# Patient Record
Sex: FEMALE
[De-identification: names, ages, dates, MRNs, and addresses within clinical notes are randomized per-mention and may not be internally consistent; named-entity substitution may affect disease eponyms.]

---

## 2017-02-15 ENCOUNTER — HOSPITAL ENCOUNTER (OUTPATIENT)
Dept: HOSPITAL 5 - OPU | Age: 62
Discharge: HOME | End: 2017-02-15
Attending: INTERNAL MEDICINE
Payer: MEDICARE

## 2017-02-15 VITALS — SYSTOLIC BLOOD PRESSURE: 124 MMHG | DIASTOLIC BLOOD PRESSURE: 59 MMHG

## 2017-02-15 DIAGNOSIS — Z95.5: ICD-10-CM

## 2017-02-15 DIAGNOSIS — I25.110: Primary | ICD-10-CM

## 2017-02-15 DIAGNOSIS — E78.5: ICD-10-CM

## 2017-02-15 DIAGNOSIS — E11.9: ICD-10-CM

## 2017-02-15 DIAGNOSIS — I10: ICD-10-CM

## 2017-02-15 DIAGNOSIS — Z87.891: ICD-10-CM

## 2017-02-15 DIAGNOSIS — Z79.01: ICD-10-CM

## 2017-02-15 DIAGNOSIS — I25.2: ICD-10-CM

## 2017-02-15 DIAGNOSIS — D64.9: ICD-10-CM

## 2017-02-15 DIAGNOSIS — Z82.49: ICD-10-CM

## 2017-02-15 LAB
ANION GAP SERPL CALC-SCNC: 18 MMOL/L
BASOPHILS NFR BLD AUTO: 0.5 % (ref 0–1.8)
BUN SERPL-MCNC: 23 MG/DL (ref 7–17)
BUN/CREAT SERPL: 32.85 %
CALCIUM SERPL-MCNC: 9 MG/DL (ref 8.4–10.2)
CHLORIDE SERPL-SCNC: 104.1 MMOL/L (ref 98–107)
CO2 SERPL-SCNC: 23 MMOL/L (ref 22–30)
EOSINOPHIL NFR BLD AUTO: 0 % (ref 0–4.3)
GLUCOSE SERPL-MCNC: 129 MG/DL (ref 65–100)
HCT VFR BLD CALC: 36.8 % (ref 30.3–42.9)
HGB BLD-MCNC: 12.4 GM/DL (ref 10.1–14.3)
INR PPP: 1.15 (ref 0.87–1.13)
MCH RBC QN AUTO: 29 PG (ref 28–32)
MCHC RBC AUTO-ENTMCNC: 34 % (ref 30–34)
MCV RBC AUTO: 85 FL (ref 79–97)
PLATELET # BLD: 166 K/MM3 (ref 140–440)
POTASSIUM SERPL-SCNC: 4.2 MMOL/L (ref 3.6–5)
RBC # BLD AUTO: 4.32 M/MM3 (ref 3.65–5.03)
SODIUM SERPL-SCNC: 141 MMOL/L (ref 137–145)
WBC # BLD AUTO: 3.8 K/MM3 (ref 4.5–11)

## 2017-02-15 PROCEDURE — 93458 L HRT ARTERY/VENTRICLE ANGIO: CPT

## 2017-02-15 PROCEDURE — 36415 COLL VENOUS BLD VENIPUNCTURE: CPT

## 2017-02-15 PROCEDURE — 93571 IV DOP VEL&/PRESS C FLO 1ST: CPT

## 2017-02-15 PROCEDURE — 80048 BASIC METABOLIC PNL TOTAL CA: CPT

## 2017-02-15 PROCEDURE — 85610 PROTHROMBIN TIME: CPT

## 2017-02-15 PROCEDURE — 93010 ELECTROCARDIOGRAM REPORT: CPT

## 2017-02-15 PROCEDURE — 85730 THROMBOPLASTIN TIME PARTIAL: CPT

## 2017-02-15 PROCEDURE — 85025 COMPLETE CBC W/AUTO DIFF WBC: CPT

## 2017-02-15 PROCEDURE — 85347 COAGULATION TIME ACTIVATED: CPT

## 2017-02-15 PROCEDURE — C1769 GUIDE WIRE: HCPCS

## 2017-02-15 PROCEDURE — C1887 CATHETER, GUIDING: HCPCS

## 2017-02-15 PROCEDURE — C1894 INTRO/SHEATH, NON-LASER: HCPCS

## 2017-02-15 PROCEDURE — 93005 ELECTROCARDIOGRAM TRACING: CPT

## 2017-02-15 RX ADMIN — MIDAZOLAM ONE MG: 1 INJECTION INTRAMUSCULAR; INTRAVENOUS at 09:52

## 2017-02-15 RX ADMIN — MIDAZOLAM ONE MG: 1 INJECTION INTRAMUSCULAR; INTRAVENOUS at 09:29

## 2017-02-15 RX ADMIN — FENTANYL CITRATE ONE MCG: 50 INJECTION, SOLUTION INTRAMUSCULAR; INTRAVENOUS at 09:29

## 2017-02-15 RX ADMIN — LIDOCAINE HYDROCHLORIDE ONE ML: 20 INJECTION, SOLUTION INFILTRATION; PERINEURAL at 09:29

## 2017-02-15 RX ADMIN — LIDOCAINE HYDROCHLORIDE ONE ML: 20 INJECTION, SOLUTION INFILTRATION; PERINEURAL at 09:53

## 2017-02-15 RX ADMIN — FENTANYL CITRATE ONE MCG: 50 INJECTION, SOLUTION INTRAMUSCULAR; INTRAVENOUS at 09:55

## 2017-02-15 RX ADMIN — HEPARIN SODIUM ONE UNIT: 1000 INJECTION, SOLUTION INTRAVENOUS; SUBCUTANEOUS at 09:30

## 2017-02-15 RX ADMIN — NITROGLYCERIN ONE MLS: 20 INJECTION INTRAVENOUS at 09:28

## 2017-02-15 RX ADMIN — HEPARIN SODIUM ONE UNIT: 1000 INJECTION, SOLUTION INTRAVENOUS; SUBCUTANEOUS at 10:07

## 2017-02-15 RX ADMIN — NITROGLYCERIN ONE MLS: 20 INJECTION INTRAVENOUS at 10:20

## 2017-02-15 RX ADMIN — LIDOCAINE HYDROCHLORIDE ONE ML: 20 INJECTION, SOLUTION INFILTRATION; PERINEURAL at 09:59

## 2017-02-15 RX ADMIN — FENTANYL CITRATE ONE MCG: 50 INJECTION, SOLUTION INTRAMUSCULAR; INTRAVENOUS at 09:52

## 2017-02-15 NOTE — CARDIAC CATHERIZATION REPORT
LEFT HEART CATHETERIZATION



CLINICAL INFORMATION:  This is a 61-year-old female with hypertension,

hyperlipidemia, known coronary artery disease.  The patient on multiple PCIs,

last one in 2012 of her LAD and drug-eluting Taxus in 2008, 3.0 x 38; the

patient's heart catheterization last year revealed mid patent stents with mid

RCA 40%-50% presents with increasing unstable angina and increased medications,

Imdur and Ranexa and beta blocker.  The patient is here for left heart

catheterization.  Left heart catheterization performed via the right femoral

artery, sterile technique, local anesthesia, 5-Citizen of [place] groin sheath inserted. 

Left system engaged with JL3.5 catheter.



FINDINGS:

1. Left main is a medium caliber vessel, patent, bifurcates into a small to

medium caliber.  Circumflex is patent, goes into small caliber 1.5 vessels of

the OM1, 2, and 3 and distal surface of 1.5 vessel.

2. LAD is a small caliber vessel, 2.5 mid stent is widely patent, distally small

caliber patent, small diagonal 1 patent.  RCA is a dominant vessel engaged with

JR4 catheter.  Mid has a 50%-60% lesion right that showed mid stents that are

widely patent.  Distal is patent, bifurcates into medium caliber PDA, PLV is

patent.

3. LV gram done in the Malay and ODELL view shows normal LV function.  LV was 180

with LVEDP of 24 mmHg, aortic was 176/76, mean of 120.  No gradient across the

aortic valve on pullback.



In view of patient's worsening symptoms and borderline lesion in the mid RCA, we

proceeded with fractional flow reserve of the RCA.



1. Exchange of the 5-Citizen of [place] groin sheath for 6-[place]izen [place] sheath.

2. Engaged RCA with a 6-Citizen [place] AR1 guiding catheter.

3. After equalization of the wire and passed the distal RCA gave IV adenosine

for 2 minutes and patient FFR was 0.9, which is nonsignificant, removed FFR. 

Repeat angiograms.  Continued JUSTIN 3 flow.  No dissection or perforation,

continued 50% lesion in the mid RCA.

4. A 6-Citizen of [place] guiding catheter taken over a guidewire, 6-Citizen [place] groin sheath was

sewn in.  No hematoma.  No bleeding.



SUMMARY:

1.  Nonobstructive mid RCA lesion with patent stents 50% with negative

fractional flow reserve 0.9.

2.  Left main patent, LAD mid stent patent, circ patent, but small vessel, OM1,

2, and 3.

3.  Normal LV function.

4.  Continue to treat the patient medically.





DD: 02/15/2017 10:28

DT: 02/15/2017 10:58

[facility]# [id number]  [id number]

MARCIANO/MICHAEL

## 2017-02-15 NOTE — SHORT STAY SUMMARY
Short Stay Documentation


Date of service: 02/15/17





- History


H&P: obtained from office





- Allergies and Medications


Current Medications: 


 Allergies





codeine Adverse Reaction (Verified 04/26/16 07:24)


 BHAVANA PUENTE





 Home Medications











 Medication  Instructions  Recorded  Confirmed  Last Taken  Type


 


Aspirin EC [Aspirin Enteric Coated 81 mg PO DAILY 04/26/16 02/15/17 02/15/17 

History





TAB]     


 


AtorvaSTATin [Lipitor] 20 mg PO QHS 04/26/16 02/15/17 02/14/17 History


 


Cholecalciferol (Vitamin D3) 2,000 units PO DAILY 04/26/16 02/15/17 02/14/17 

History


 


Clopidogrel Bisulfate [Clopidogrel] 75 mg PO DAILY 04/26/16 02/15/17 02/15/17 

History


 


Cyanocobalamin [Vitamin B-12] 1,000 mcg SUB-Q BID 04/26/16 02/15/17 02/14/17 

History


 


Fenofibrate 160 mg PO DAILY 04/26/16 02/15/17 02/14/17 History


 


ISOSORBIDE MONOnitrate 30 mg PO DAILY 04/26/16 02/15/17 02/14/17 History


 


Metoprolol Tartrate [Lopressor] 25 mg PO BID 04/26/16 02/15/17 02/14/17 History


 


Norco 5-325 mg TAB 1 tab PO BID 04/26/16 02/15/17 02/14/17 History


 


Omeprazole [PriLOSEC] 20 mg PO DAILY 04/26/16 02/15/17 02/14/17 History


 


Ranolazine ER [Ranexa ER] 500 mg PO BID 04/26/16 02/15/17 02/14/17 History


 


Zolpidem [Ambien] 10 mg PO QHS 04/26/16 02/15/17 02/14/17 History


 


Lisinopril [Lisinopril] 10 mg PO DAILY 02/15/17 02/15/17 02/14/17 History








Active Medications





Sodium Chloride (Nacl 0.9% 500 Ml)  500 mls @ 50 mls/hr IV AS DIRECT GILDARDO


   Stop: 02/15/17 16:59











- Brief post op/procedure progress note


Date of procedure: 02/15/17


Pre-op diagnosis: angina


Post-op diagnosis: same


Procedure: 





see report


Anesthesia: local


Estimated blood loss: none


Pathology: none





- Disposition


Condition at discharge: Good


Disposition: DISCHARGED TO HOME OR SELFCARE





- Discharge Diagnoses


(1) CAD (coronary artery disease)


Status: Acute   


Qualifiers: 


   Coronary Disease-Associated Artery/Lesion type: native artery   Native vs. 

transplanted heart: native heart   Associated angina: with stable angina   

Qualified Code(s): I25.118 - Atherosclerotic heart disease of native coronary 

artery with other forms of angina pectoris   





(2) Angina effort


Status: Chronic   





(3) Hyperlipemia


Status: Chronic   


Qualifiers: 


   Hyperlipidemia type: mixed hyperlipidemia   Qualified Code(s): E78.2 - Mixed 

hyperlipidemia   





(4) Hypertension


Status: Chronic   


Qualifiers: 


   Hypertension type: essential hypertension   Qualified Code(s): I10 - 

Essential (primary) hypertension   





(5) SOB (shortness of breath) on exertion


Status: Chronic   





Short Stay Discharge Plan


Activity: advance as tolerated


Diet: low fat, low cholesterol


Wound: keep clean and dry


Follow up with: 


JAE BARRIENTOS MD [Primary Care Provider] - 7 Days

## 2017-08-07 ENCOUNTER — HOSPITAL ENCOUNTER (OUTPATIENT)
Dept: HOSPITAL 5 - SPVWC | Age: 62
Discharge: HOME | End: 2017-08-07
Attending: INTERNAL MEDICINE
Payer: MEDICARE

## 2017-08-07 DIAGNOSIS — Z87.891: ICD-10-CM

## 2017-08-07 DIAGNOSIS — E11.9: ICD-10-CM

## 2017-08-07 DIAGNOSIS — E78.00: ICD-10-CM

## 2017-08-07 DIAGNOSIS — F41.9: ICD-10-CM

## 2017-08-07 DIAGNOSIS — D64.9: ICD-10-CM

## 2017-08-07 DIAGNOSIS — Z12.31: Primary | ICD-10-CM

## 2017-08-07 DIAGNOSIS — F32.9: ICD-10-CM

## 2017-08-07 PROCEDURE — 77067 SCR MAMMO BI INCL CAD: CPT

## 2017-08-07 PROCEDURE — G0202 SCR MAMMO BI INCL CAD: HCPCS

## 2017-08-07 NOTE — MAMMOGRAPHY REPORT
BILATERAL DIGITAL SCREENING MAMMOGRAM with CAD: 08/07/17 10:42:00



CLINICAL: Routine screening.



COMPARISON:12/30/15



FINDINGS: The breasts are heterogeneously dense, which may obscure 

small masses. No mass, architectural distortion or suspicious 

calcifications.



IMPRESSION: No mammographic evidence of malignancy.



BI-RADS CATEGORY: 1 - - Negative



RECOMMENDATION: Routine mammographic screening in one year.





COMMENT:

Patient follow-up letters are generated by our [employer] application.

## 2018-02-14 ENCOUNTER — HOSPITAL ENCOUNTER (OUTPATIENT)
Dept: HOSPITAL 5 - CATHLABREC | Age: 63
Setting detail: OBSERVATION
LOS: 1 days | Discharge: HOME | End: 2018-02-15
Attending: INTERNAL MEDICINE | Admitting: INTERNAL MEDICINE
Payer: MEDICARE

## 2018-02-14 DIAGNOSIS — I25.2: ICD-10-CM

## 2018-02-14 DIAGNOSIS — I25.118: Primary | ICD-10-CM

## 2018-02-14 DIAGNOSIS — Z87.891: ICD-10-CM

## 2018-02-14 DIAGNOSIS — Z95.5: ICD-10-CM

## 2018-02-14 DIAGNOSIS — E11.9: ICD-10-CM

## 2018-02-14 DIAGNOSIS — E78.5: ICD-10-CM

## 2018-02-14 DIAGNOSIS — Z82.49: ICD-10-CM

## 2018-02-14 DIAGNOSIS — I10: ICD-10-CM

## 2018-02-14 DIAGNOSIS — D64.9: ICD-10-CM

## 2018-02-14 LAB — INR PPP: 1.11 (ref 0.87–1.13)

## 2018-02-14 PROCEDURE — C1769 GUIDE WIRE: HCPCS

## 2018-02-14 PROCEDURE — 82553 CREATINE MB FRACTION: CPT

## 2018-02-14 PROCEDURE — 82550 ASSAY OF CK (CPK): CPT

## 2018-02-14 PROCEDURE — 85025 COMPLETE CBC W/AUTO DIFF WBC: CPT

## 2018-02-14 PROCEDURE — 92920 PRQ TRLUML C ANGIOP 1ART&/BR: CPT

## 2018-02-14 PROCEDURE — C1753 CATH, INTRAVAS ULTRASOUND: HCPCS

## 2018-02-14 PROCEDURE — 93005 ELECTROCARDIOGRAM TRACING: CPT

## 2018-02-14 PROCEDURE — 36415 COLL VENOUS BLD VENIPUNCTURE: CPT

## 2018-02-14 PROCEDURE — 85730 THROMBOPLASTIN TIME PARTIAL: CPT

## 2018-02-14 PROCEDURE — 92978 ENDOLUMINL IVUS OCT C 1ST: CPT

## 2018-02-14 PROCEDURE — 82962 GLUCOSE BLOOD TEST: CPT

## 2018-02-14 PROCEDURE — 71045 X-RAY EXAM CHEST 1 VIEW: CPT

## 2018-02-14 PROCEDURE — 80048 BASIC METABOLIC PNL TOTAL CA: CPT

## 2018-02-14 PROCEDURE — C1725 CATH, TRANSLUMIN NON-LASER: HCPCS

## 2018-02-14 PROCEDURE — 96374 THER/PROPH/DIAG INJ IV PUSH: CPT

## 2018-02-14 PROCEDURE — C1894 INTRO/SHEATH, NON-LASER: HCPCS

## 2018-02-14 PROCEDURE — 84484 ASSAY OF TROPONIN QUANT: CPT

## 2018-02-14 PROCEDURE — 85610 PROTHROMBIN TIME: CPT

## 2018-02-14 PROCEDURE — G0378 HOSPITAL OBSERVATION PER HR: HCPCS

## 2018-02-14 PROCEDURE — 85347 COAGULATION TIME ACTIVATED: CPT

## 2018-02-14 PROCEDURE — 93458 L HRT ARTERY/VENTRICLE ANGIO: CPT

## 2018-02-14 PROCEDURE — C1887 CATHETER, GUIDING: HCPCS

## 2018-02-14 PROCEDURE — 93010 ELECTROCARDIOGRAM REPORT: CPT

## 2018-02-14 RX ADMIN — HYDROCODONE BITARTRATE AND ACETAMINOPHEN PRN EACH: 5; 325 TABLET ORAL at 17:27

## 2018-02-14 RX ADMIN — FENTANYL CITRATE ONE MCG: 50 INJECTION, SOLUTION INTRAMUSCULAR; INTRAVENOUS at 09:03

## 2018-02-14 RX ADMIN — FENTANYL CITRATE ONE MCG: 50 INJECTION, SOLUTION INTRAMUSCULAR; INTRAVENOUS at 08:45

## 2018-02-14 RX ADMIN — RANOLAZINE SCH MG: 500 TABLET, EXTENDED RELEASE ORAL at 22:08

## 2018-02-14 RX ADMIN — BIVALIRUDIN ONE MG: 250 INJECTION, POWDER, LYOPHILIZED, FOR SOLUTION INTRAVENOUS at 09:01

## 2018-02-14 RX ADMIN — METOPROLOL TARTRATE SCH MG: 25 TABLET, FILM COATED ORAL at 22:09

## 2018-02-14 RX ADMIN — BIVALIRUDIN ONE MG: 250 INJECTION, POWDER, LYOPHILIZED, FOR SOLUTION INTRAVENOUS at 09:19

## 2018-02-14 RX ADMIN — ISOSORBIDE MONONITRATE SCH MG: 30 TABLET, EXTENDED RELEASE ORAL at 22:08

## 2018-02-14 RX ADMIN — BIVALIRUDIN ONE MG: 250 INJECTION, POWDER, LYOPHILIZED, FOR SOLUTION INTRAVENOUS at 08:58

## 2018-02-14 NOTE — CARDIAC CATHERIZATION REPORT
REFERRING PHYSICIAN:  Dr. Myers.



INDICATION FOR PROCEDURE:  The patient is a very pleasant 62-year-old 

female with a history of 2 different PCIs to the right coronary, both

drug-eluting stent, in-stent restenosis with unstable angina, referred here for

left heart catheterization.  Risks, benefits, and potential alternatives

explained at length prior to obtaining informed consent.  During the last

procedure, they are unable to use radial access and thus we chose a groin

access.



PROCEDURE IN DETAIL:  The patient was brought to the cath lab in a

postabsorptive state, prepped and draped in sterile fashion, 8 mL of 2%

lidocaine used to anesthetize the right groin.  A standard 6 Malaysian sheath used

to cannulate the right common femoral artery via modified Seldinger technique. 

All exchanges were performed to exchange a J-tip guidewire.  A JR4 catheter used

to engage in the left main.  No dampening or ventricularization. 

Cineangiography performed in all projections.  JR4 catheter was used to cross

the aortic valve under fluoroscopic guidance.  Left ventriculography performed

in 30 ODELL and 30 Chadian projections via hand injections, catheter flushed.  Manual

pullback performed with continuous pressure monitoring.  Catheter used to engage

the right coronary.  No dampening or ventricularization.  Cineangiography

performed in all projections.



DATA:  Aortic pressure is 150/70, LV pressure is 150, LVP of 25 mmHg.  Left

ventriculography revealed normal systolic performance.  Estimated ejection

fraction of 55-60%.  No evidence of aortic stenosis.



CORONARY ANATOMY:  Right dominant system.  Left main without significant

disease, bifurcates left anterior descending and left circumflex.  Left

circumflex is a small to moderate vessel, courses AV groove.  Scattered luminal

irregularities, but no discrete stenosis identified.  LAD is a moderate sized

vessel, courses anterior intergroove.  It is heavily calcified.  There is a 25%

stenosis in the mid LAD.  The right coronary is well visualized.  There are

stents from proximal to distal.  There are two layers of stent.  First layer was

a TAXUS, second layer is a Xience.  There is a 99% in-stent restenosis in the

middle of the stent.



At this point, our options include single-vessel bypass surgery.  I am not

comfortable putting in a third layer of drug-eluting stent.  At this point, I

decided to proceed with a balloon angioplasty and optimize medical therapy prior

to consideration of a single vessel CABG.  At this point, we turned attention to

PCI.  Angiomax was given.  Abnormal ACT is confirmed.  The patient is loaded

with aspirin and Effient.  I used a JR4 guide.  Prowater wire to cross the

lesion without difficulty.  One bolus of Aggrastat was given.  We started off

with a 2.5 x 12 noncompliant, 10 LONA for 30 seconds.  Then, we expand this to a

3.0 x 15 noncompliant balloon.  Next, intravascular ultrasound was performed

throughout the right coronary.  There is still 20% in-stent restenosis at the

culprit lesion.  Next, 3.5 x 8 noncompliant balloon.  Multiple balloon

dilatations were made.  Next, IVUS revealed a well-apposed, well-expanded stent.

 MLA of approximately 5.8.  Excellent final angiographic result.  Residual

stenosis of approximately 10-20%.  No complications were noted.  The groin

sheath was secured in place, will be pulled in 2 hours.



CONCLUSIONS:

1.  Severe single-vessel coronary artery disease with 99% in-stent restenosis of

mid right coronary, successful IVUS guided PTCA with excellent angiographic and

endoscopic results.

2.  25% calcific mid LAD.

3.  Normal LV function.

4.  No evidence of aortic stenosis.



At this point, we would continue medical management, consider adding Ranexa. 

Continue Effient, aspirin, statin therapy.  Results of procedure explained at

length to the patient and family.  Discussed with Dr. Myers as well.





DD: 02/14/2018 09:28

DT: 02/14/2018 10:33

JOB# [id number]  [id number]

SBM/NTS

## 2018-02-14 NOTE — CARDIAC CATHERIZATION REPORT
ADDENDUM ON CATHETERIZATION



Moderate sedation was directly supervised by me.  Fentanyl and Versed were

administered.  Sedation started at 8:45 and ended at 9:30.





DD: 02/14/2018 09:30

DT: 02/14/2018 09:39

JOB# [id number]  [id number]

SBM/NTS

## 2018-02-15 VITALS — SYSTOLIC BLOOD PRESSURE: 113 MMHG | DIASTOLIC BLOOD PRESSURE: 68 MMHG

## 2018-02-15 LAB
BASOPHILS # (AUTO): 0 K/MM3 (ref 0–0.1)
BASOPHILS NFR BLD AUTO: 0.6 % (ref 0–1.8)
BUN SERPL-MCNC: 18 MG/DL (ref 7–17)
BUN/CREAT SERPL: 23 %
CALCIUM SERPL-MCNC: 8.3 MG/DL (ref 8.4–10.2)
EOSINOPHIL # BLD AUTO: 0 K/MM3 (ref 0–0.4)
EOSINOPHIL NFR BLD AUTO: 0 % (ref 0–4.3)
HCT VFR BLD CALC: 33 % (ref 30.3–42.9)
HEMOLYSIS INDEX: 7
HGB BLD-MCNC: 11.4 GM/DL (ref 10.1–14.3)
LYMPHOCYTES # BLD AUTO: 1 K/MM3 (ref 1.2–5.4)
LYMPHOCYTES NFR BLD AUTO: 26.3 % (ref 13.4–35)
MCH RBC QN AUTO: 29 PG (ref 28–32)
MCHC RBC AUTO-ENTMCNC: 35 % (ref 30–34)
MCV RBC AUTO: 85 FL (ref 79–97)
MONOCYTES # (AUTO): 0.4 K/MM3 (ref 0–0.8)
MONOCYTES % (AUTO): 11.8 % (ref 0–7.3)
PLATELET # BLD: 153 K/MM3 (ref 140–440)
RBC # BLD AUTO: 3.88 M/MM3 (ref 3.65–5.03)

## 2018-02-15 RX ADMIN — ISOSORBIDE MONONITRATE SCH MG: 30 TABLET, EXTENDED RELEASE ORAL at 09:21

## 2018-02-15 RX ADMIN — HYDROCODONE BITARTRATE AND ACETAMINOPHEN PRN EACH: 5; 325 TABLET ORAL at 09:31

## 2018-02-15 RX ADMIN — METOPROLOL TARTRATE SCH MG: 25 TABLET, FILM COATED ORAL at 09:22

## 2018-02-15 RX ADMIN — RANOLAZINE SCH MG: 500 TABLET, EXTENDED RELEASE ORAL at 09:22

## 2018-02-15 NOTE — XRAY REPORT
AP CHEST:



HISTORY: Post PCI



AP view of the chest demonstrates a normal mediastinal and

cardiac contour with clear lungs and normal bony and soft tissue

structures.



IMPRESSION:

No acute cardiopulmonary findings.

## 2018-02-15 NOTE — SHORT STAY SUMMARY
Short Stay Documentation


Date of service: 02/15/18





- History


H&P: obtained from office





- Allergies and Medications


Current Medications: 


 Allergies





adhesive tape Allergy (Verified 02/14/18 07:01)


 Rash


codeine Adverse Reaction (Intermediate, Verified 02/14/18 07:01)


 SHAKEYBHAVANA





 Home Medications











 Medication  Instructions  Recorded  Confirmed  Last Taken  Type


 


Aspirin [Adult Low Dose Aspirin EC] 81 mg PO QDAY 04/29/17 02/14/18 02/14/18 06:

30 History





    81MG 


 


AtorvaSTATin [Lipitor] 20 mg PO QDAY 04/29/17 02/14/18 02/13/18 History





    20MG 


 


Cholecalciferol (Vitamin D3) 2,000 unit PO QDAY 04/29/17 02/14/18 02/14/18 06:

30 History





[Vitamin D3 2,000 unit]     


 


Cyanocobalamin (Vitamin B-12) 1,000 mcg IM QMONTH 04/29/17 02/14/18 01/20/18 

History





[Physicians Ez Use B-12]    1000MCG 


 


Fenofibrate [Lofibra] 160 mg PO QDAY 04/29/17 02/14/18 02/13/18 History





    160MG 


 


ISOSORBIDE MONOnitrate [Imdur ER] 30 mg PO BID 04/29/17 02/14/18 02/13/18 

History





    30MG 


 


Lisinopril [Zestril TAB] 10 mg PO QDAY 04/29/17 02/14/18 02/14/18 06:30 History


 


Metoprolol [Lopressor TAB] 50 mg PO BID 04/29/17 02/14/18 02/14/18 06:00 History


 


Omeprazole Magnesium [PriLOSEC Otc] 20 mg PO BID 04/29/17 02/14/18 02/13/18 

History





    20MG 


 


Ranolazine ER [Ranexa ER] 1,000 mg PO BID 04/29/17 02/14/18 02/14/18 06:30 

History


 


Zolpidem [Ambien] 10 mg PO QHS 04/29/17 02/14/18 02/13/18 History





    10MG 


 


Denosumab [Prolia] 60 mg SQ Q6MONTHS 05/15/17 02/14/18 01/15/18 History


 


Olopatadine HCl [Pataday 0.2%] 1 drop OP QDAY 05/15/17 02/14/18 02/13/18 History





    1 DROP 


 


Prasugrel [Effient] 10 mg PO QDAY #30 tablet 05/17/17 02/14/18 02/14/18 06:30 Rx


 


Albuterol Sulfate [Proventil Hfa] 2 puff INHALATION PRN PRN 02/14/18 02/14/18 02 /12/18 History





    2 PUFFS 


 


Folic Acid [Folvite] 1 mg PO Q48H 02/14/18 02/14/18 02/13/18 History





    1MG 


 


HYDROcodone/ACETAMINOPHEN [Norco 1 tab PO TID PRN 02/14/18 02/14/18 02/14/18 06:

30 History





5-325 Tablet]     


 


Tiotropium Br/Olodaterol HCl 2 puff INHALATION DAILY 02/14/18 02/14/18 02/14/18 

06:30 History





[Stiolto Respimat Inhal Spray]     








Active Medications





Acetaminophen (Tylenol)  650 mg PO Q6H PRN


   PRN Reason: Pain, Mild (1-3)


Acetaminophen/Hydrocodone Bitart (Norco 5/325)  1 each PO Q4H PRN


   PRN Reason: Pain


   Last Admin: 02/15/18 09:31 Dose:  1 each


Albuterol (Proventil)  2.5 mg IH PRN PRN


   PRN Reason: Shortness Of Breath


Aspirin (Ecotrin)  325 mg PO QDAY [facility]


   Last Admin: 02/15/18 09:22 Dose:  325 mg


Atorvastatin Calcium (Lipitor)  80 mg PO QHS [facility]


   Last Admin: 02/14/18 22:08 Dose:  80 mg


Folic Acid (Folvite)  1 mg PO Q48H [facility]


   Last Admin: 02/15/18 09:23 Dose:  1 mg


Isosorbide Mononitrate (Imdur)  30 mg PO BID [facility]


   Last Admin: 02/15/18 09:21 Dose:  30 mg


Lisinopril (Zestril)  10 mg PO QDAY [facility]


   Last Admin: 02/15/18 09:22 Dose:  10 mg


Metoprolol Tartrate (Lopressor)  50 mg PO BID [facility]


   Last Admin: 02/15/18 09:22 Dose:  50 mg


Ondansetron HCl (Zofran)  4 mg IV Q4H PRN


   PRN Reason: Nausea And Vomiting


Prasugrel (Effient)  10 mg PO QDAY [facility]


   Last Admin: 02/15/18 09:22 Dose:  10 mg


Ranolazine (Ranexa Er)  1,000 mg PO BID [facility]


   Last Admin: 02/15/18 09:22 Dose:  1,000 mg


Zolpidem Tartrate (Ambien)  10 mg PO QHS [facility]


   Last Admin: 02/14/18 22:08 Dose:  10 mg











- Physical exam


General appearance: no acute distress


Lungs: Clear to auscultation


Gastrointestinal: normal, normoactive bowel sounds


Extremities: no ischemia, pulses intact, pulses symmetrical, No edema, normal 

temperature, normal color


Neurological: Normal gait





- Brief post op/procedure progress note


Date of procedure: 02/14/18


Pre-op diagnosis: CAD


Post-op diagnosis: same


Procedure: 


LHC - see cath report





Anesthesia: local


Estimated blood loss: none


Condition: stable





- Disposition


Condition at discharge: Stable


Disposition: DC-01 TO HOME OR SELFCARE





- Discharge Diagnoses


(1) CAD (coronary artery disease)


Status: Chronic   


Qualifiers: 


 





(2) Stented coronary artery


Status: Chronic   





(3) Hyperlipemia


Status: Chronic   


Qualifiers: 


 





(4) Hypertension


Status: Chronic   


Qualifiers: 


 





(5) T2DM (type 2 diabetes mellitus)


Status: Chronic   


Qualifiers: 


 





Short Stay Discharge Plan


Activity: advance as tolerated


Diet: low fat, low cholesterol, low salt


Wound: open to air, keep clean and dry, per your surgeon's advice


Follow up with: 


JAE BARRIENTOS MD [Primary Care Provider] - 7 Days


RONAK MYERS MD [Staff Physician] - 7 Days ([place] office with Dr. Myers on 3/5/2018 @ 11:45AM)


Prescriptions: 


AtorvaSTATin [Lipitor] 80 mg PO QHS #30 tablet


Prasugrel [Effient] 10 mg PO QDAY #30 tablet

## 2018-08-28 ENCOUNTER — HOSPITAL ENCOUNTER (OUTPATIENT)
Dept: HOSPITAL 5 - SPVWC | Age: 63
Discharge: HOME | End: 2018-08-28
Attending: INTERNAL MEDICINE
Payer: MEDICARE

## 2018-08-28 DIAGNOSIS — Z90.710: ICD-10-CM

## 2018-08-28 DIAGNOSIS — I10: ICD-10-CM

## 2018-08-28 DIAGNOSIS — Z12.31: Primary | ICD-10-CM

## 2018-08-28 DIAGNOSIS — E11.9: ICD-10-CM

## 2018-08-28 DIAGNOSIS — J44.9: ICD-10-CM

## 2018-08-28 DIAGNOSIS — M19.90: ICD-10-CM

## 2018-08-28 DIAGNOSIS — I25.10: ICD-10-CM

## 2018-08-28 DIAGNOSIS — E78.5: ICD-10-CM

## 2018-08-28 DIAGNOSIS — Z90.89: ICD-10-CM

## 2018-08-28 DIAGNOSIS — K21.9: ICD-10-CM

## 2018-08-28 PROCEDURE — 77067 SCR MAMMO BI INCL CAD: CPT

## 2018-08-29 NOTE — MAMMOGRAPHY REPORT
BILATERAL DIGITAL SCREENING MAMMOGRAM with CAD : 08/28/18 10:09:00



CLINICAL: Routine screening.Previous right benign biopsy.



COMPARISON:08/07/17, 12/30/15 and 07/08/14



FINDINGS: The breasts are heterogeneously dense, which may obscure 

small masses.A group of right upper outer low density punctate 

calcifications is stable.  Bilateral benign vascular calcifications. No 

mass, architectural distortion or suspicious calcifications.



IMPRESSION: No mammographic evidence of malignancy.



BI-RADS CATEGORY:  2 -- Benign



RECOMMENDATION: Routine mammographic screening in one year.



COMMENT:

Patient follow-up letters are generated by our [employer] application.

## 2018-09-28 ENCOUNTER — HOSPITAL ENCOUNTER (OUTPATIENT)
Dept: HOSPITAL 5 - CT | Age: 63
Discharge: HOME | End: 2018-09-28
Attending: INTERNAL MEDICINE
Payer: MEDICARE

## 2018-09-28 DIAGNOSIS — E78.5: ICD-10-CM

## 2018-09-28 DIAGNOSIS — M19.90: ICD-10-CM

## 2018-09-28 DIAGNOSIS — I10: ICD-10-CM

## 2018-09-28 DIAGNOSIS — K21.9: ICD-10-CM

## 2018-09-28 DIAGNOSIS — Z87.891: ICD-10-CM

## 2018-09-28 DIAGNOSIS — Z90.710: ICD-10-CM

## 2018-09-28 DIAGNOSIS — E78.00: ICD-10-CM

## 2018-09-28 DIAGNOSIS — J43.9: Primary | ICD-10-CM

## 2018-09-28 DIAGNOSIS — Z90.89: ICD-10-CM

## 2018-09-28 DIAGNOSIS — E11.9: ICD-10-CM

## 2018-09-28 DIAGNOSIS — I25.10: ICD-10-CM

## 2018-09-28 LAB
ALBUMIN SERPL-MCNC: 3.9 G/DL (ref 3.9–5)
ALT SERPL-CCNC: 17 UNITS/L (ref 7–56)
APTT BLD: 25.7 SEC. (ref 24.2–36.6)
BUN SERPL-MCNC: 20 MG/DL (ref 7–17)
BUN/CREAT SERPL: 33 %
CALCIUM SERPL-MCNC: 8.9 MG/DL (ref 8.4–10.2)
HCT VFR BLD CALC: 32.7 % (ref 30.3–42.9)
HDLC SERPL-MCNC: 32 MG/DL (ref 40–59)
HEMOLYSIS INDEX: 4
HGB BLD-MCNC: 11.3 GM/DL (ref 10.1–14.3)
INR PPP: 1.03 (ref 0.87–1.13)
MCH RBC QN AUTO: 31 PG (ref 28–32)
MCHC RBC AUTO-ENTMCNC: 35 % (ref 30–34)
MCV RBC AUTO: 90 FL (ref 79–97)
PLATELET # BLD: 185 K/MM3 (ref 140–440)
RBC # BLD AUTO: 3.63 M/MM3 (ref 3.65–5.03)

## 2018-09-28 PROCEDURE — 85730 THROMBOPLASTIN TIME PARTIAL: CPT

## 2018-09-28 PROCEDURE — 85610 PROTHROMBIN TIME: CPT

## 2018-09-28 PROCEDURE — 85027 COMPLETE CBC AUTOMATED: CPT

## 2018-09-28 PROCEDURE — 71275 CT ANGIOGRAPHY CHEST: CPT

## 2018-09-28 PROCEDURE — 80053 COMPREHEN METABOLIC PANEL: CPT

## 2018-09-28 PROCEDURE — 84436 ASSAY OF TOTAL THYROXINE: CPT

## 2018-09-28 PROCEDURE — 93970 EXTREMITY STUDY: CPT

## 2018-09-28 PROCEDURE — 80061 LIPID PANEL: CPT

## 2018-09-28 PROCEDURE — 84443 ASSAY THYROID STIM HORMONE: CPT

## 2018-09-28 PROCEDURE — 36415 COLL VENOUS BLD VENIPUNCTURE: CPT

## 2018-09-28 NOTE — CAT SCAN REPORT
CTA CHEST:



HISTORY: Chest pain, shortness of breath.



COMPARISON: none.



TECHNIQUE: Helical CT in 1.25mm intervals following IV contrast.  

Pulmonary embolus protocol.  Sagittal and coronal reformatted images.  

Rotational MIP images.



FINDINGS:





Contrast bolus is satisfactory.  No pulmonary embolus is identified.



Thyroid gland: Normal.



Tracheobronchial tree: Normal.



Esophagus: Normal.



Heart: Normal.



Pericardium: Normal.



Mediastinum: Normal.



Lung Fields: Moderate to severe centrilobular emphysematous changes are 

identified in both upper lung zone. No evidence for nodule, mass or 

infiltrate.



Pleural Spaces: Normal.



Musculoskeletal: Intact.





IMPRESSION: 

No evidence for pulmonary embolus.  

Emphysema.

## 2019-06-24 NOTE — CARDIAC CATHERIZATION REPORT
LEFT HEART CATHETERIZATION



CLINICAL INFORMATION:  This is a 64-year-old  female with known

coronary artery disease with LAD stents, has two drug-eluting stents in the RCA,

had balloon-only angioplasty for in-stent restenosis last year for unstable

angina.  The patient is having worsening anginal symptoms despite medical

therapy, class 3.



Sedation was done, moderate sedation; 1 mg Versed and 50 mcg fentanyl.  Total

sedation time was 15 minutes, started at 8:51 a.m. and finished at 9:03 a.m.



DESCRIPTION OF PROCEDURE:

1.  Procedure was done via the right common femoral artery, sterile technique,

local anesthesia, 6-Citizen of [place] groin sheath.  Radial access was not achievable last

2 times.

2.  Left system engaged with JL3.5 catheter.  Left main is a medium caliber

vessel, patent, bifurcates into medium caliber LAD with mid stent widely patent,

mild luminal irregularities.  Diagonal 1 small caliber vessel.  Circumflex and

AV groove is a small to medium caliber vessel, patent with mild luminal

irregularities.  OM1 is small caliber vessel that is patent.  RCA engaged with

JR4, is a large dominant vessel, proximal is same, mid stent has in-stent

restenosis with the proximal portion of the stent with a focal 80-90% lesion. 

Distal portion of the stent has a 40% in-stent restenosis and then distal RCA

patent, bifurcates into medium caliber PDA, PLV that are patent with mild

luminal irregularities, covers the lateral wall.  LV gram done in JONH and ODELL

view shows normal LV function, LVEDP at 30 mmHg, LV is 180/30, aortic is 178/67.

 No gradient across the aortic valve on pullback.  5-[place]izen [place] catheters were taken

over guidewire, 6-[place]izen [place] groin sheath was discontinued.  Manual pressure held. 

No hematoma, no bleeding.



SUMMARY:  Left main patent, LAD stent patent, small circ patent, OM1 patent. 

RCA has a mid in-stent restenosis, 90% with distal 40%, has two drug-eluting

stents within the RCA.  The patient will continue medical therapy.  We will

consider cutting balloon burst experimental shock wave therapy into the RCA.  We

will discuss with [place] for possible off label uses and discuss this with the

patient and patient's family.





DD: 06/24/2019 09:20

DT: 06/24/2019 10:14

JOB# [id number]  [id number]

MARCIANO/MICHAEL

## 2019-06-24 NOTE — SHORT STAY SUMMARY
Short Stay Documentation


Date of service: 06/24/19





- History


H&P: obtained from office





- Allergies and Medications


Current Medications: 


                                    Allergies





adhesive tape Allergy (Verified 02/14/18 07:01)


   Rash


codeine Adverse Reaction (Intermediate, Verified 02/14/18 07:01)


   SHAKEY,NERVOUS





                                Home Medications











 Medication  Instructions  Recorded  Confirmed  Last Taken  Type


 


Cholecalciferol (Vitamin D3) 5,000 unit PO QDAY 04/29/17 06/24/19 06/24/19 05:30

History





[Vitamin D3 2,000 UNIT CAP]    5000IU 


 


Cyanocobalamin (Vitamin B-12) 1,000 mcg IM Q2W 04/29/17 06/24/19 06/20/19 

History





[Physicians Ez Use B-12]    1000mcg 


 


Fenofibrate [Lofibra] 160 mg PO QDAY 04/29/17 06/24/19 06/23/19 History





    160mg 


 


ISOSORBIDE MONOnitrate [Imdur ER] 30 mg PO BID 04/29/17 06/24/19 06/24/19 05:30 

History





    30mg 


 


Lisinopril [Zestril TAB] 10 mg PO QDAY 04/29/17 06/24/19 06/23/19 History





    10mg 


 


Metoprolol [Lopressor TAB] 50 mg PO BID 04/29/17 06/24/19 06/23/19 History





    50mg 


 


Omeprazole Magnesium [PriLOSEC Otc] 20 mg PO BID 04/29/17 06/24/19 06/23/19 

History





    20mg 


 


Ranolazine ER [Ranexa ER] 1,000 mg PO BID 04/29/17 06/24/19 06/23/19 History





    1000mg 


 


Zolpidem [Ambien] 10 mg PO QHS 04/29/17 06/24/19 06/23/19 History





    10mg 


 


Denosumab [Prolia] 60 mg SQ Q6MONTHS 05/15/17 06/24/19 01/15/19 History





    60mg 


 


Albuterol Sulfate [Proventil Hfa] 2 puff INHALATION PRN PRN 02/14/18 06/24/19 06/23/19 History





    2 puffs 


 


Folic Acid [Folvite] 1 mg PO Q48H 02/14/18 06/24/19 06/24/19 05:30 History





    1mg 


 


HYDROcodone/APAP 5-325 [Norco 1 each PO Q4H PRN  tablet 02/15/18 06/24/19 

06/23/19 Rx





5-325 mg TAB]    1 tab 


 


Prasugrel [Effient] 10 mg PO QDAY #30 tablet 02/15/18 06/24/19 06/23/19 Rx





    10mg 


 


Anoro Ellipta 62.5-25 Mcg INH 1 puff INHALATION DAILY 06/24/19 06/24/19 06/23/19

 History





    1 puff 


 


Aspirin EC 81 mg PO DAILY 06/24/19 06/24/19 06/23/19 History





    81mg 


 


AtorvaSTATin [Lipitor] 40 mg PO QHS 06/24/19 06/24/19 06/23/19 History





    40mg 


 


Omega-3/Dha/Epa/Fish Oil [Fish Oil 1 cap PO DAILY 06/24/19 06/24/19 06/23/19 

History





1,200 mg Softgel]    1 cap 


 


cycloSPORINE [Restasis 0.05%] 1 drop INTRAOCULA BID 06/24/19 06/24/19 06/23/19 

History





    1 drop 


 


predniSONE 0.5 tab PO Q48HR 06/24/19 06/24/19 06/23/19 History





    0.5 tab 








Active Medications





Sodium Chloride (Nacl 0.9% 500 Ml)  500 mls @ 50 mls/hr IV AS DIRECT GILDARDO


   Stop: 06/24/19 16:59


   Last Admin: 06/24/19 07:35 Dose:  50 mls/hr


   Documented by: 











- Brief post op/procedure progress note


Date of procedure: 06/24/19


Pre-op diagnosis: angina


Post-op diagnosis: same


Procedure: 





see report


Anesthesia: local


Estimated blood loss: none


Pathology: none





- Disposition


Condition at discharge: Good


Disposition: DC-01 TO HOME OR SELFCARE





- Discharge Diagnoses


(1) Angina effort


Status: Chronic   





(2) CAD (coronary artery disease)


Status: Chronic   


Qualifiers: 


   Coronary Disease-Associated Artery/Lesion type: native artery   Native vs. 

transplanted heart: native heart   Associated angina: with stable angina   

Qualified Code(s): I25.118 - Atherosclerotic heart disease of native coronary 

artery with other forms of angina pectoris   





(3) COPD (chronic obstructive pulmonary disease)


Status: Chronic   


Qualifiers: 


   COPD type: chronic bronchitis   Chronic bronchitis type: simple   Qualified 

Code(s): J41.0 - Simple chronic bronchitis   





(4) Hyperlipemia


Status: Chronic   


Qualifiers: 


   Hyperlipidemia type: mixed hyperlipidemia 





(5) Hypertension


Status: Chronic   


Qualifiers: 


   Hypertension type: essential hypertension 





Short Stay Discharge Plan


Activity: advance as tolerated


Diet: low fat, low salt


Wound: keep clean and dry


Follow up with: 


JAE BARRIENTOS MD [Primary Care Provider] - 7 Days

## 2019-08-06 NOTE — HISTORY AND PHYSICAL REPORT
History of Present Illness


Date of examination: 08/06/19


Date of admission: 


8/6/2019


Chief complaint: 





chest pain 


History of present illness: 





65 y/o female with htn, chol and cad having increasing angina symptoms, lhc done

last month isr 80% in mid rca. patent lad stent and normal lv function, patient 

is on maximal tolerable dose of isosorbide Ranexa beta blocker on dual 

antiplatelet therapy and hyperlipidemic therapy.  Patient has [place] 

classification angina 3.  Patient denies any nausea vomiting.  Patient also has 

stress from family.  Patient denies any fever or chills syncope





Past History


Past Medical History: CAD, hypertension, hyperlipidemia


Past Surgical History: PTCA


Social history: denies: smoking, alcohol abuse, prescription drug abuse


Family history: denies: no significant family history





Medications and Allergies


                                    Allergies











Allergy/AdvReac Type Severity Reaction Status Date / Time


 


adhesive tape Allergy  Rash Verified 02/14/18 07:01


 


codeine AdvReac Intermediate SHAKEY,NERV Verified 02/14/18 07:01





   OUS  











                                Home Medications











 Medication  Instructions  Recorded  Confirmed  Last Taken  Type


 


Cholecalciferol (Vitamin D3) 5,000 unit PO QDAY 04/29/17 08/06/19 08/05/19 

History





[Vitamin D3 2,000 UNIT CAP]     


 


Cyanocobalamin (Vitamin B-12) 1,000 mcg IM QMONTH 04/29/17 08/06/19 07/22/19 

History





[Physicians Ez Use B-12]     


 


Fenofibrate [Lofibra] 160 mg PO QHS 04/29/17 08/06/19 08/05/19 History


 


ISOSORBIDE MONOnitrate [Imdur ER] 30 mg PO BID 04/29/17 08/06/19 08/05/19 

History


 


Lisinopril [Zestril TAB] 10 mg PO QDAY 04/29/17 08/06/19 08/05/19 History


 


Metoprolol [Lopressor TAB] 50 mg PO BID 04/29/17 08/06/19 08/06/19 06:50 History


 


Omeprazole Magnesium [PriLOSEC Otc] 20 mg PO BID 04/29/17 08/06/19 08/05/19 

History


 


Ranolazine ER [Ranexa ER] 1,000 mg PO BID 04/29/17 08/06/19 08/05/19 History


 


Zolpidem [Ambien] 10 mg PO QHS 04/29/17 08/06/19 08/05/19 History


 


Denosumab [Prolia] 60 mg SQ Q6MONTHS 05/15/17 08/06/19 08/05/19 History


 


Albuterol Sulfate [Proventil Hfa] 2 puff INHALATION PRN PRN 02/14/18 08/06/19 1 

Week Ago History





    ~07/30/19 


 


Folic Acid [Folvite] 1 mg PO DAILY 02/14/18 08/06/19 08/05/19 History


 


HYDROcodone/APAP 5-325 [Norco 1 each PO Q4H PRN  tablet 02/15/18 08/06/19 

08/05/19 Rx





5-325 mg TAB]     


 


Prasugrel [Effient] 10 mg PO QDAY #30 tablet 02/15/18 08/06/19 08/06/19 06:50 Rx


 


Anoro Ellipta 62.5-25 Mcg INH 1 puff INHALATION DAILY 06/24/19 06/24/19 1 Week 

Ago History





    ~07/30/19 


 


Aspirin EC 81 mg PO DAILY 06/24/19 08/06/19 08/06/19 06:50 History


 


AtorvaSTATin [Lipitor] 40 mg PO QHS 06/24/19 08/06/19 08/05/19 History


 


Omega-3/Dha/Epa/Fish Oil [Fish Oil 1 cap PO DAILY 06/24/19 08/06/19 08/05/19 

History





1,200 mg Softgel]     


 


cycloSPORINE [Restasis 0.05%] 1 drop INTRAOCULA BID 06/24/19 08/06/19 08/05/19 

History


 


predniSONE 0.5 tab PO Q48HR 06/24/19 08/06/19 08/04/19 History











Active Meds: 


Active Medications





Sodium Chloride (Nacl 0.9% 500 Ml)  500 mls @ 50 mls/hr IV AS DIRECT GILDARDO


   Stop: 08/06/19 16:59


   Last Admin: 08/06/19 08:13 Dose:  50 mls/hr


   Documented by: 











Review of Systems


All systems: negative (as per hpi)





Physical Examination


                                   Vital Signs











Temp Pulse Resp BP Pulse Ox


 


 97.9 F   80   20   155/70   98 


 


 08/06/19 07:07  08/06/19 07:07  08/06/19 07:07  08/06/19 07:07  08/06/19 07:07











General appearance: no acute distress


HEENT: Positive: PERRL, Mucus Membranes Moist


Neck: Positive: neck supple, trachea midline


Cardiac: Positive: Reg Rate and Rhythm, S1/S2.  Negative: Audible Murmur


Lungs: Positive: clear to auscultation, Normal Breath Sounds


Neuro: Positive: Grossly Intact


Abdomen: Positive: Soft, Active Bowel Sounds.  Negative: Tender, Distended


Female genitourinary: deferred


Skin: Positive: Clear


Incision: Cardiac Cath Site


Musculoskeletal: No Pain, Normal Range of Motion


Extremities: Present: normal.  Absent: edema





Results





                                 08/06/19 06:58





                                 08/06/19 06:58


                                   Coagulation











  08/06/19 08/06/19 Range/Units





  06:58 06:58 


 


PT   13.9  (12.2-14.9)  Sec.


 


INR   1.10  (0.87-1.13)  


 


APTT  34.3   (24.2-36.6)  Sec.








                                       CBC











  08/06/19 Range/Units





  06:58 


 


WBC  2.7 L  (4.5-11.0)  K/mm3


 


RBC  3.88  (3.65-5.03)  M/mm3


 


Hgb  12.1  (10.1-14.3)  gm/dl


 


Hct  34.3  (30.3-42.9)  %


 


Plt Count  143  (140-440)  K/mm3


 


Lymph #  1.0 L  (1.2-5.4)  K/mm3


 


Mono #  0.3  (0.0-0.8)  K/mm3


 


Eos #  0.0  (0.0-0.4)  K/mm3


 


Baso #  0.0  (0.0-0.1)  K/mm3








                          Comprehensive Metabolic Panel











  08/06/19 Range/Units





  06:58 


 


Sodium  145  (137-145)  mmol/L


 


Potassium  4.0  (3.6-5.0)  mmol/L


 


Chloride  108.4 H  ()  mmol/L


 


Carbon Dioxide  26  (22-30)  mmol/L


 


BUN  16  (7-17)  mg/dL


 


Creatinine  0.7  (0.7-1.2)  mg/dL


 


Glucose  135 H  ()  mg/dL


 


Calcium  9.5  (8.4-10.2)  mg/dL














- Imaging and Cardiology


Echo: report reviewed (normal lv function 6/2019)


Cardiac cath: report reviewed





EKG interpretations





- Telemetry


EKG Rhythm: Sinus Bradycardia (sinus bradycardia non specific st-t)





Assessment and Plan





Proceed with percutaneous coronary intervention with cutting balloon of the mid 

RCA and repeat IVUS and balloon angioplasty with high pressure balloon 

noncompliant explained the risk and benefits to the patient patient expressed 

understanding





- Patient Problems


(1) Exertional chest pain


Current Visit: No   Status: Acute   





(2) Angina effort


Current Visit: No   Status: Chronic   





(3) CAD (coronary artery disease)


Current Visit: No   Status: Chronic   


Qualifiers: 


   Coronary Disease-Associated Artery/Lesion type: native artery   Native vs. 

transplanted heart: native heart   Associated angina: with unstable angina   

Qualified Code(s): I25.110 - Atherosclerotic heart disease of native coronary 

artery with unstable angina pectoris   





(4) COPD (chronic obstructive pulmonary disease)


Current Visit: No   Status: Chronic   


Qualifiers: 


   Emphysema type: unspecified 





(5) Hyperlipemia


Current Visit: No   Status: Chronic   


Qualifiers: 


   Hyperlipidemia type: mixed hyperlipidemia 





(6) Hypertension


Current Visit: No   Status: Chronic   


Qualifiers: 


   Hypertension type: essential hypertension

## 2019-08-06 NOTE — CARDIAC CATHERIZATION REPORT
PERCUTANEOUS CORONARY INTERVENTION WITH INTRAVASCULAR ULTRASOUND 



CLINICAL INFORMATION:  This is a 64-year-old  female with known history

of coronary artery disease in 2008, had a PCI of the RCA with a 3.0 x 20 and

then in 2016 had PCI of the RCA for in-stent restenosis with 3.0 x 26

drug-eluting Resolute.  The patient has known coronary artery disease with

patent LAD stents.  The patient had anginal symptoms, last year had in-stent

restenosis, had balloon only angioplasty, recurrent anginal symptoms, heart

catheterization revealed in-stent restenosis in the mid portion of the stent 90%

and distal 40% in-stent restenosis, having anginal symptoms despite maximal

medical therapy on beta blockers, nitrates, Ranexa, and long-acting nitrates on

dual antiplatelet therapy.



The patient was done with moderate sedation started at 9:55 a.m., finished at

10:33 which is 38 minutes of supervised sedation.  Procedure was performed by

the right common femoral artery, sterile technique, local anesthesia, 6-Maldivian

groin sheath inserted.



Left system engaged with JR4 guiding catheter.  Angiographic reveals RCA large,

proximal is patent, long mid stent, proximal portion of the stent had a 90%

in-stent restenosis, distal portion of the stent had a 30% in-stent restenosis. 

Distal is patent, bifurcates into multiple branches that are medium caliber and

patent and then crossed into the distal PDA with short [place] wire.



1.  Intravascular ultrasound showed 2 stents with some mild apposition in the

proximal portion with mild calcification and gaby-proliferation noted.

2.  Using an AngioSculpt balloon 3.0 x 15, inflated to 10 atmospheres for 60

seconds x 2 inflations.  The patient did have discomfort in the chest, then

ballooned with a noncompliant NC 3.25 x 15, inflated at 22 atmospheres and

repeat angiogram.  The patient had multiple intracoronary nitroglycerin given.

3.  The patient's angiogram showed reduced stenosis less than 20%.  Good

angiographic result.  Repeat IVUS shows better stent apposition, but still the

proximal portion of the stent does have gaby-proliferation noted, but better

stent apposition.

4.  Remove coronary wire, multiple angiograms, continued JUSTIN 3 flow, reduced

stenosis less than 20%, distal portion of the stent has in-stent restenosis,

30%.  A 6-Maldivian guiding catheter taken over a guidewire, 6-Maldivian groin sheath

sewn in.  No hematoma, no bleeding.



SUMMARY:

1.  Successful percutaneous AngioSculpt device cutting balloon of the proximal

portion of the stent in the mid RCA with 3.0 x 15 and post-dilated with

noncompliant 3.25 x 15.  Intravascular ultrasound shows better stent apposition,

but there is still some mild malposition with calcification gaby-proliferation

noted.

2.  A 6-Maldivian post-PCI care, aspirin, Effient and continue home medications. 

Discussed in detail with the patient, the patient's family.  If the patient has

recurrent in-stent restenosis, may consider laser therapy or brachytherapy.





DD: 08/06/2019 10:50

DT: 08/06/2019 11:51

JOB# [id number]  [id number]

MARCIANO/MICHAEL

## 2019-08-07 NOTE — PROGRESS NOTE
Assessment and Plan





pt had cutting balloon to mid RCA postdilated noncompliant 3.25 x 15 mm balloon 

patient labs are stable.  Hemoglobin has decreased down to 8.9 and iron tablets 

decrease Lopressor 25mg bid, repeat cbc and pt is ambulating without chest pain 

or angina





- Patient Problems


(1) Exertional chest pain


Current Visit: No   Status: Acute   





(2) Angina effort


Current Visit: No   Status: Chronic   





(3) CAD (coronary artery disease)


Current Visit: No   Status: Chronic   


Qualifiers: 


   Coronary Disease-Associated Artery/Lesion type: native artery   Native vs. 

transplanted heart: native heart   Associated angina: with unstable angina   

Qualified Code(s): I25.110 - Atherosclerotic heart disease of native coronary 

artery with unstable angina pectoris   





(4) COPD (chronic obstructive pulmonary disease)


Current Visit: No   Status: Chronic   


Qualifiers: 


   Emphysema type: unspecified 





(5) Hyperlipemia


Current Visit: No   Status: Chronic   


Qualifiers: 


   Hyperlipidemia type: mixed hyperlipidemia 





(6) Hypertension


Current Visit: No   Status: Chronic   


Qualifiers: 


   Hypertension type: essential hypertension 





Subjective


Date of service: 08/07/19


Principal diagnosis: chest pain


Interval history: 





post cath chest pain free 





Objective


                                   Vital Signs











  Temp Pulse Resp BP Pulse Ox


 


 08/07/19 07:49  98.2 F  79  18  109/50  95


 


 08/07/19 03:33  97.6 F  76  18  118/53  97


 


 08/06/19 23:26  97.8 F  69  18  124/53  94


 


 08/06/19 22:29     138/58 


 


 08/06/19 22:28     138/58 


 


 08/06/19 19:32  98.2 F  68  20  130/58  99


 


 08/06/19 16:42  97.5 F L  65  18  137/56  99


 


 08/06/19 14:30   58 L  19  106/55  98


 


 08/06/19 14:15   60  15  103/50  98


 


 08/06/19 14:00   60  15  109/55  94


 


 08/06/19 13:45   63  15  122/56  99


 


 08/06/19 13:30   66  11 L  120/58  100


 


 08/06/19 13:20   56 L  15  97/54  99


 


 08/06/19 13:16   56 L  10 L  106/57  97


 


 08/06/19 13:10   55 L  12  101/58  98


 


 08/06/19 13:05   62  16  98/54  98


 


 08/06/19 12:58   65  15  109/56  99


 


 08/06/19 12:52   58 L  13  103/63  99


 


 08/06/19 12:45   58 L  13  103/63  99


 


 08/06/19 12:41   69   


 


 08/06/19 12:30   64  16  103/60  99


 


 08/06/19 12:15   58 L  13  101/54  99


 


 08/06/19 12:00   62  12  98/51  98


 


 08/06/19 11:45   58 L  11 L  101/52  98


 


 08/06/19 11:30   63  16  105/53  96


 


 08/06/19 11:15   68  12  111/58  98


 


 08/06/19 11:00  98.0 F  71  16  103/59  95














- Physical Examination


General: No Apparent Distress


HEENT: Positive: PERRL, Mucus Membranes Moist


Neck: Positive: neck supple, trachea midline


Cardiac: Positive: Reg Rate and Rhythm


Lungs: Positive: clear to auscultation


Neuro: Positive: Grossly Intact


Abdomen: Positive: Soft, Active Bowel Sounds.  Negative: Tender, Distended


Skin: Positive: Clear


Incision: Cardiac Cath Site (soft no hematoma)


Musculoskeletal: No Pain, Normal Range of Motion


Extremities: Present: normal.  Absent: edema





- Labs and Meds


                                 Cardiac Enzymes











  08/07/19 Range/Units





  05:13 


 


CK-MB (CK-2)  1.2  (0.0-4.0)  ng/mL








                                       CBC











  08/07/19 Range/Units





  05:13 


 


WBC  3.4 L  (4.5-11.0)  K/mm3


 


RBC  2.88 L  (3.65-5.03)  M/mm3


 


Hgb  8.8 L D  (10.1-14.3)  gm/dl


 


Hct  25.8 L D  (30.3-42.9)  %


 


Plt Count  138 L  (140-440)  K/mm3


 


Lymph #  0.9 L  (1.2-5.4)  K/mm3


 


Mono #  0.3  (0.0-0.8)  K/mm3


 


Eos #  0.0  (0.0-0.4)  K/mm3


 


Baso #  0.0  (0.0-0.1)  K/mm3








                          Comprehensive Metabolic Panel











  08/07/19 Range/Units





  05:13 


 


Sodium  142  (137-145)  mmol/L


 


Potassium  3.3 L  (3.6-5.0)  mmol/L


 


Chloride  109.9 H  ()  mmol/L


 


Carbon Dioxide  24  (22-30)  mmol/L


 


BUN  14  (7-17)  mg/dL


 


Creatinine  0.6 L  (0.7-1.2)  mg/dL


 


Glucose  148 H  ()  mg/dL


 


Calcium  7.9 L D  (8.4-10.2)  mg/dL














- Imaging and Cardiology


Echo: report reviewed (normal lv function 6/2019)


Cardiac cath: report reviewed





- Telemetry


EKG Rhythm: Sinus Rhythm





- EKG


Sinus rhythms and dysrhythmias: sinus rhythm

## 2019-08-07 NOTE — DISCHARGE SUMMARY
<SHELBIEANUP CLARKE - Last Filed: 08/07/19 13:57>





Providers





- Providers


Date of Admission: 


08/06/19 10:58





Date of discharge: 08/07/19


Attending physician: 


RONAK ALVAREZ





                                        





08/06/19


Consult to Cardiac Rehabilitation [CONS] Routine 


   Reason For Exam: post pci











Primary care physician: 


JAE BARRIENTOS








Hospitalization


Reason for admission: PCI


Condition: Stable


Pertinent studies: 


LH with PCI - see dictated cath report





Procedures: 


LHC with PCI - see dictated cath report





Hospital course: 


Pt is a 63 YO female with past medical history of CAD s/p PCI, HTN, HLP, DM, 

anemia. Pt was experiencing exertional angina despite appropriate anti-anginal 

medications and thus was scheduled for elective LHC. Pt underwent LHC with PCI 

had cutting balloon to mid RCA postdilated noncompliant 3.25 x 15 mm balloon 

patient labs are stable. H/H was noted to be decreased but stable and iron 

tablets were initiated. Lopressor was decreased to 25mg bid, d/c lisinopril, pt 

is ambulating without chest pain or angina. Pt is medically stable for 

discharge. 





Disposition: DC-01 TO HOME OR SELFCARE





- Discharge Diagnoses


(1) Exertional chest pain


Status: Acute   





(2) Angina effort


Status: Chronic   





(3) CAD (coronary artery disease)


Status: Chronic   


Qualifiers: 


   Coronary Disease-Associated Artery/Lesion type: native artery   Native vs. 

transplanted heart: native heart   Associated angina: with unstable angina   

Qualified Code(s): I25.110 - Atherosclerotic heart disease of native coronary 

artery with unstable angina pectoris   





(4) COPD (chronic obstructive pulmonary disease)


Status: Chronic   


Qualifiers: 


   Emphysema type: unspecified 





(5) Hyperlipemia


Status: Chronic   


Qualifiers: 


   Hyperlipidemia type: mixed hyperlipidemia   Qualified Code(s): E78.2 - Mixed 

hyperlipidemia   





(6) Hypertension


Status: Chronic   


Qualifiers: 


   Hypertension type: essential hypertension   Qualified Code(s): I10 - 

Essential (primary) hypertension   





(7) Anemia


Status: Chronic   





Core Measure Documentation





- Palliative Care


Palliative Care/ Comfort Measures: Not Applicable





Exam





- Constitutional


Vitals: 


                                        











Temp Pulse Resp BP Pulse Ox


 


 98.2 F   79   18   109/50   95 


 


 08/07/19 07:49  08/07/19 07:49  08/07/19 07:49  08/07/19 11:03  08/07/19 07:49











General appearance: Present: no acute distress





- EENT


Eyes: Present: PERRL, EOM intact


ENT: hearing intact, clear oral mucosa, dentition normal





- Neck


Neck: Present: supple, normal ROM





- Cardiovascular


Rhythm: irregularly irregular


Heart Sounds: Present: S1 & S2





- Extremities


Extremities: no ischemia, pulses intact


Peripheral Pulses: within normal limits





- Abdominal


General gastrointestinal: Present: soft, non-tender





- Integumentary


Integumentary: Present: clear, warm, dry





- Musculoskeletal


Musculoskeletal: strength equal bilaterally





- Psychiatric


Psychiatric: appropriate mood/affect





Plan


Activity: advance as tolerated


Diet: low fat, low cholesterol, low salt


Wound: open to air, keep clean and dry, per your surgeon's advice


Follow up with: 


JAE BARRIENTOS MD [Primary Care Provider] - 7 Days


RONAK ALVAREZ MD [Staff Physician] - 7 Days (8/12/2019 @ 1:00PM)


Forms:  CardCat PCI D/C Instructions


Prescriptions: 


Ferrous Sulfate [Iron 325 MG] 325 mg PO BID #60 tablet


Metoprolol [Lopressor TAB] 25 mg PO BID #60 tablet





<RONAK ALVAREZ - Last Filed: 08/08/19 08:47>





Providers





- Providers


Date of Admission: 


08/06/19 10:58





Attending physician: 


RONAK ALVAREZ





                                        





08/06/19


Consult to Cardiac Rehabilitation [CONS] Routine 


   Reason For Exam: post pci











Primary care physician: 


JAE BARRIENTOS








Hospitalization





- Discharge Diagnoses


(1) Exertional chest pain


Status: Acute   





(2) Angina effort


Status: Chronic   





(3) CAD (coronary artery disease)


Status: Chronic   


Qualifiers: 


   Coronary Disease-Associated Artery/Lesion type: native artery   Native vs. 

transplanted heart: native heart   Associated angina: with unstable angina   

Qualified Code(s): I25.110 - Atherosclerotic heart disease of native coronary ar

sadia with unstable angina pectoris   





(4) COPD (chronic obstructive pulmonary disease)


Status: Chronic   


Qualifiers: 


   Emphysema type: unspecified 





(5) Hyperlipemia


Status: Chronic   


Qualifiers: 


   Hyperlipidemia type: mixed hyperlipidemia   Qualified Code(s): E78.2 - Mixed 

hyperlipidemia   





(6) Hypertension


Status: Chronic   


Qualifiers: 


   Hypertension type: essential hypertension   Qualified Code(s): I10 - 

Essential (primary) hypertension   





Core Measure Documentation





- Core Measures


Any of the following diagnoses?: none





Exam





- Constitutional


Vitals: 


                                        











Temp Pulse Resp BP Pulse Ox


 


 98.2 F   79   18   109/50   95 


 


 08/07/19 07:49  08/07/19 07:49  08/07/19 07:49  08/07/19 11:03  08/07/19 07:49

## 2019-09-27 NOTE — EMERGENCY DEPARTMENT REPORT
ED Back Pain/Injury HPI





- General


Chief Complaint: Fall


Stated Complaint: FALL


Time Seen by Provider: 09/27/19 16:43


Source: patient


Limitations: No Limitations





- History of Present Illness


Initial Comments: 





65 YO COMES TO ER SP GLF AT HOME. MECHANICAL IN NATURE. FELL ON LEFT SIDE. 

AMBULATORY. NO LOC. VSS. 





TOOK NOTHING PTA





CO LACS TO ELBOW AND LEGS. 





- Related Data


                                Home Medications











 Medication  Instructions  Recorded  Confirmed  Last Taken


 


Cholecalciferol (Vitamin D3) 5,000 unit PO QDAY 04/29/17 08/06/19 08/05/19





[Vitamin D3 2,000 UNIT CAP]    


 


Cyanocobalamin (Vitamin B-12) 1,000 mcg IM QMONTH 04/29/17 08/06/19 07/22/19





[Physicians Ez Use B-12]    


 


Fenofibrate [Lofibra] 160 mg PO QHS 04/29/17 08/06/19 08/05/19


 


ISOSORBIDE MONOnitrate [Imdur ER] 30 mg PO BID 04/29/17 08/06/19 08/05/19


 


Omeprazole Magnesium [PriLOSEC Otc] 20 mg PO BID 04/29/17 08/06/19 08/05/19


 


Ranolazine ER [Ranexa ER] 1,000 mg PO BID 04/29/17 08/06/19 08/05/19


 


Zolpidem [Ambien] 10 mg PO QHS 04/29/17 08/06/19 08/05/19


 


Denosumab [Prolia] 60 mg SQ Q6MONTHS 05/15/17 08/06/19 08/05/19


 


Albuterol Sulfate [Proventil Hfa] 2 puff INHALATION PRN PRN 02/14/18 08/06/19 1 

Week Ago





    ~07/30/19


 


Folic Acid [Folvite] 1 mg PO DAILY 02/14/18 08/06/19 08/05/19


 


Anoro Ellipta 62.5-25 Mcg INH 1 puff INHALATION DAILY 06/24/19 06/24/19 1 Week 

Ago





    ~07/30/19


 


Aspirin EC [Halfprin EC] 81 mg PO DAILY 06/24/19 08/06/19 08/06/19 06:50


 


AtorvaSTATin [Lipitor] 40 mg PO QHS 06/24/19 08/06/19 08/05/19


 


Omega-3/Dha/Epa/Fish Oil [Fish Oil 1 cap PO DAILY 06/24/19 08/06/19 08/05/19





1,200 mg Softgel]    


 


cycloSPORINE [Restasis 0.05%] 1 drop INTRAOCULA BID 06/24/19 08/06/19 08/05/19


 


predniSONE 0.5 tab PO Q48HR 06/24/19 08/06/19 08/04/19








                                  Previous Rx's











 Medication  Instructions  Recorded  Last Taken  Type


 


HYDROcodone/APAP 5-325 [Norco 1 each PO Q4H PRN  tablet 02/15/18 08/05/19 Rx





5-325 mg TAB]    


 


Prasugrel [Effient] 10 mg PO QDAY #30 tablet 02/15/18 08/06/19 06:50 Rx


 


Ferrous Sulfate [Iron 325 MG] 325 mg PO BID #60 tablet 08/07/19 Unknown Rx


 


Metoprolol [Lopressor TAB] 25 mg PO BID #60 tablet 08/07/19 Unknown Rx











                                    Allergies











Allergy/AdvReac Type Severity Reaction Status Date / Time


 


adhesive tape Allergy  Rash Verified 02/14/18 07:01


 


codeine AdvReac Intermediate SHAKEY,NERV Verified 02/14/18 07:01





   OUS  














ED Review of Systems


ROS: 


Stated complaint: FALL


Other details as noted in HPI





Comment: All other systems reviewed and negative





ED Past Medical Hx





- Past Medical History


Medical history: AMI, arthritis, asthma, CAD, GERD, hyperlipidemia, hypertension


MIGRAINES


Surgical history: coronary bypass surgery


Psychiatric history: no pertinent history


Family history: no significant family history





ED Back Pain Physical Exam





- Exam


General: 


Vital signs noted. No distress. Alert and acting appropriately.





Back/Abdomen: No Abdominal Tenderness, No Perithoracic Tenderness, No Perilumbar

Tenderness, No Sacroiliac Tenderness, No Flank Tenderness, No Straight Leg Raise

Pain


Neuro: Yes Normal Sensation, Yes Normal DTR's, Yes Normal Gait, No Motor 

Weakness





ED Course


                                   Vital Signs











  09/27/19





  16:43


 


Temperature 97.9 F


 


Pulse Rate 89


 


Respiratory 18





Rate 


 


Blood Pressure 158/76





[Left] 


 


O2 Sat by Pulse 95





Oximetry 














ED Medical Decision Making





- Radiology Data


Radiology results: report reviewed, image reviewed





- Medical Decision Making





WOUND CARE PROVIDED TO L ELBOW AND BLE. 


CLEANED, SSD AND DRESSED





TDAP GIVEN 





DECLINES PAIN MED





NEURO INTACT


FULL ROM ALL EXTREMITIES


NO SPINE TENDERNESS





DC HOME WITH DC PLAN OF CARE WITH FAMILY








                                   Vital Signs











  09/27/19 09/27/19





  16:43 18:15


 


Temperature 97.9 F 


 


Pulse Rate 89 


 


Respiratory 18 18





Rate  


 


Blood Pressure 158/76 





[Left]  


 


O2 Sat by Pulse 95 





Oximetry  














- Differential Diagnosis


SP GLF


Critical care attestation.: 


If time is entered above; I have spent that time in minutes in the direct care 

of this critically ill patient, excluding procedure time.








ED Disposition


Clinical Impression: 


 Fall from ground level, Multiple contusions, Abrasions of multiple sites





Disposition: DC-01 TO HOME OR SELFCARE


Is pt being admited?: No


Does the pt Need Aspirin: No


Condition: Stable


Instructions:  Contusion in Adults (ED), Abrasion (ED)


Additional Instructions: 


KEEP WOUNDS CLEAN AND DRY





MOTRIN OR TYLENOL FOR PAIN





EXPECT TO BE SORE IN NEXT COUPLE DAYS





FOLLOW UP WITH PCP ON MONDAY FOR A RECHECK TO BE SURE YOU ARE GETTING BETTER





DIET AND ACTIVITY AS TOLERATED








Referrals: 


JAE BARRIENTOS MD [Staff Physician] - 3-5 Days


Time of Disposition: 17:54

## 2019-09-27 NOTE — EVENT NOTE
ED Screening Note


Date of service: 09/27/19


Time: 16:43


ED Screening Note: 





This is a 64 y.o. F. that presents to the ER with lacerations to left lateral 

lower leg and left lateral forearm.





Patient states she miss stepped from her front porch 30-40 minutes PTA.





She also reports pain with bruising to left ribs.





This initial assessment/diagnostic orders/clinical plan/treatment(s) is/are 

subject to change based on patients health status, clinical progression and re-

assessment by fellow clinical providers in the ED. Further treatment and workup 

at subsequent clinical providers discretion. Patient/guardian urged not to elope

from the ED as their condition may be serious if not clinically assessed and 

managed. 





Initial orders include: 





XR left ribs